# Patient Record
Sex: MALE | Race: WHITE | NOT HISPANIC OR LATINO | Employment: FULL TIME | URBAN - METROPOLITAN AREA
[De-identification: names, ages, dates, MRNs, and addresses within clinical notes are randomized per-mention and may not be internally consistent; named-entity substitution may affect disease eponyms.]

---

## 2020-03-15 ENCOUNTER — HOSPITAL ENCOUNTER (EMERGENCY)
Facility: HOSPITAL | Age: 35
Discharge: HOME | End: 2020-03-15
Attending: EMERGENCY MEDICINE | Admitting: EMERGENCY MEDICINE

## 2020-03-15 ENCOUNTER — APPOINTMENT (OUTPATIENT)
Dept: GENERAL RADIOLOGY | Facility: HOSPITAL | Age: 35
End: 2020-03-15

## 2020-03-15 VITALS
TEMPERATURE: 97.9 F | DIASTOLIC BLOOD PRESSURE: 75 MMHG | HEART RATE: 86 BPM | WEIGHT: 239.42 LBS | RESPIRATION RATE: 16 BRPM | SYSTOLIC BLOOD PRESSURE: 123 MMHG | HEIGHT: 72 IN | OXYGEN SATURATION: 99 % | BODY MASS INDEX: 32.43 KG/M2

## 2020-03-15 DIAGNOSIS — S62.315A CLOSED DISPLACED FRACTURE OF BASE OF FOURTH METACARPAL BONE OF LEFT HAND, INITIAL ENCOUNTER: ICD-10-CM

## 2020-03-15 DIAGNOSIS — S62.313A CLOSED DISPLACED FRACTURE OF BASE OF THIRD METACARPAL BONE OF LEFT HAND, INITIAL ENCOUNTER: Primary | ICD-10-CM

## 2020-03-15 PROCEDURE — 73110 X-RAY EXAM OF WRIST: CPT

## 2020-03-15 PROCEDURE — 73130 X-RAY EXAM OF HAND: CPT

## 2020-03-15 PROCEDURE — 99283 EMERGENCY DEPT VISIT LOW MDM: CPT

## 2020-03-15 RX ORDER — HYDROCODONE BITARTRATE AND ACETAMINOPHEN 5; 325 MG/1; MG/1
1 TABLET ORAL EVERY 6 HOURS PRN
Qty: 12 TABLET | Refills: 0 | Status: SHIPPED | OUTPATIENT
Start: 2020-03-15

## 2020-03-15 NOTE — ED NOTES
Patient stood up and had a head rush.  He passed out and woke up with his left hand swelling.  He has some swelling on his hand. Left shoulder pain as well, but patient states it is minimal. Denies hitting his head.      Josefa Fuentes RN  03/15/20 [id number]

## 2020-03-15 NOTE — DISCHARGE INSTRUCTIONS
Elevate ice no use.  Norco will make sleepy and constipated increase fluid and fiber  Follow-up as directed above.  Return for cold blue fingers uncontrolled pain feel like the splint is too tight or any other new or worse problems or concerns

## 2020-03-15 NOTE — ED PROVIDER NOTES
Subjective   Chief complaint left hand pain    History of present illness 34-year-old male states he stood up real quick any kind of got dizzy and fell backwards striking his hand on a coffee table.  This happened about 6 hours ago he complains of some pain and swelling to the hand he can move it he states it is moderate degree worse with movement better with rest but ongoing 6 hours.  No elbow pain no shoulder pain no numbness or tingling no loss of consciousness no other complaints          Review of Systems   Constitutional: Negative for chills and fever.   Respiratory: Negative for chest tightness and shortness of breath.    Gastrointestinal: Negative for abdominal pain and vomiting.   Musculoskeletal: Positive for joint swelling. Negative for arthralgias.   Skin: Negative for color change and pallor.   Neurological: Negative for numbness.   Psychiatric/Behavioral: Negative for agitation.       No past medical history on file.    No Known Allergies    No past surgical history on file.    No family history on file.    Social History     Socioeconomic History   • Marital status: Single     Spouse name: Not on file   • Number of children: Not on file   • Years of education: Not on file   • Highest education level: Not on file   No medicines        Objective   Physical Exam  34-year-old male awake alert no distress examination at left hand wrist some swelling and tenderness over the fourth and fifth metacarpals left hand no snuffbox pain he can flex and extend his fingers without difficulty he can make a fist there is no rotation deformity there is some mild pain to the wrist but no deformities no limited range of motion he can move it through full range of motion without difficulty no snuffbox pain.  No open wounds.  Good cap refill good radial and ulnar pulses.  No forearm pain no elbow pain or shoulder pain.  No pain with passive stretching no evidence of compartment syndrome  Procedures           ED Course    No  results found for this or any previous visit.  No radiology results for the last day  Medications - No data to display      X-ray showed fracture of the base of the third and fourth metacarpal                                     MDM  Number of Diagnoses or Management Options  Closed displaced fracture of base of fourth metacarpal bone of left hand, initial encounter:   Closed displaced fracture of base of third metacarpal bone of left hand, initial encounter:   Diagnosis management comments: Medical decision making.  Patient had the above exam and evaluation.  X-rays obtained showed fractures of the base of the third and fourth metacarpal with some mild displacement.  These are closed fractures.  The patient was placed in a volar plaster splint.  Immobilized in position of function.  Sling applied.  He will referred to orthopedic on an outpatient basis.  He remained neurovascular sensory intact after the plaster splint applied no evidence of compartment syndrome.  We stressed the importance of follow-up and surgical evaluation.  He voices understanding.  Inspect reviewed      Final diagnoses:   Closed displaced fracture of base of third metacarpal bone of left hand, initial encounter   Closed displaced fracture of base of fourth metacarpal bone of left hand, initial encounter            Ismael Xie MD  03/15/20 0405       Ismael Xie MD  03/15/20 0406